# Patient Record
Sex: MALE | Race: WHITE | NOT HISPANIC OR LATINO | Employment: FULL TIME | URBAN - METROPOLITAN AREA
[De-identification: names, ages, dates, MRNs, and addresses within clinical notes are randomized per-mention and may not be internally consistent; named-entity substitution may affect disease eponyms.]

---

## 2022-09-07 ENCOUNTER — TRANSCRIBE ORDERS (OUTPATIENT)
Dept: PHYSICAL THERAPY | Facility: CLINIC | Age: 22
End: 2022-09-07

## 2022-09-07 DIAGNOSIS — S93.402S SPRAIN OF LEFT ANKLE, UNSPECIFIED LIGAMENT, SEQUELA: Primary | ICD-10-CM

## 2022-09-20 ENCOUNTER — TELEPHONE (OUTPATIENT)
Dept: PHYSICAL THERAPY | Facility: CLINIC | Age: 22
End: 2022-09-20

## 2022-09-21 ENCOUNTER — TELEPHONE (OUTPATIENT)
Dept: PHYSICAL THERAPY | Facility: CLINIC | Age: 22
End: 2022-09-21

## 2022-09-21 NOTE — TELEPHONE ENCOUNTER
PATIENT AND WORK COMP CALLED TO RESCHEDULE PATIENT-I DID NOT SEE ANYTHING UNTIL 10/11 PLEASE REACH OUT TO THE PATIENT TO RESCHEDULE AT YOUR EARLIEST CONVENIENCE ! THANK YOU!!

## 2022-10-06 ENCOUNTER — TREATMENT (OUTPATIENT)
Dept: PHYSICAL THERAPY | Facility: CLINIC | Age: 22
End: 2022-10-06

## 2022-10-06 DIAGNOSIS — S93.402D SPRAIN OF LEFT ANKLE, UNSPECIFIED LIGAMENT, SUBSEQUENT ENCOUNTER: Primary | ICD-10-CM

## 2022-10-06 PROCEDURE — 97161 PT EVAL LOW COMPLEX 20 MIN: CPT | Performed by: PHYSICAL THERAPIST

## 2022-10-06 PROCEDURE — 97110 THERAPEUTIC EXERCISES: CPT | Performed by: PHYSICAL THERAPIST

## 2022-10-06 NOTE — PROGRESS NOTES
Physical Therapy Initial Evaluation and Plan of Care  [address] [facility] [address], [address]  [place] [address]    Patient: Bulmaro Cameron   : 2000  Diagnosis/ICD-10 Code:  Sprain of left ankle, unspecified ligament, subsequent encounter [S93.402D]  Referring practitioner: RONNIE Cintron  Date of Initial Visit: 10/6/2022  Today's Date: 10/6/2022  Patient seen for 1 session         Visit Diagnoses:    ICD-10-CM ICD-9-CM   1. Sprain of left ankle, unspecified ligament, subsequent encounter  S93.402D V58.89     845.00         Subjective Questionnaire: FAAM 59      Subjective Evaluation    History of Present Illness  Date of onset: 2022  Mechanism of injury: Patient injured his left ankle at work.  Reports that he was in a trailer, rollers were pulled out and boxes stacked.  He was standing on the RTL and stepped off onto a box rolling his ankle.  Patient rolled his ankle inward.  Reports being seen by [facility] after.  Was put in a boot for about 2.5 weeks.      No history of ankle injuries.      Patient Occupation: [employer]   Precautions and Work Restrictions: normal job dutiesPain  Current pain ratin  At worst pain ratin  Location: throughout the left ankle and up the back of the leg  Quality: throbbing and sharp  Relieving factors: rest  Exacerbated by: long periods of standing.  Progression: improved             Objective          Observations     Additional Ankle/Foot Observation Details  Patient ambulates with a slow, guarded gait pattern.    Palpation     Additional Palpation Details  TTP to the left ATFL and minimally to the deltoid ligament.    Active Range of Motion   Left Ankle/Foot   Dorsiflexion (ke): WFL  Plantar flexion: WFL  Inversion: WFL  Eversion: WFL    Strength/Myotome Testing     Left Ankle/Foot   Dorsiflexion: 5  Plantar flexion: 5  Inversion: 4+  Eversion: 4+    Tests   Left Ankle/Foot   Negative for anterior drawer.     Additional Tests Details  - Inversion and -  Eversion Stress Tests          Assessment & Plan     Assessment  Impairments: lacks appropriate home exercise program and pain with function    Assessment details: Patient presents with c/o pain and TTP, but no other significant limitations.  Patient reports doing full job duties and not being limited with any ADL'S.    Barriers to therapy: none  Prognosis: good  Prognosis details: STG's to be met by 2 weeks  1)  Independent with HEP  2)  Decrease pain by 50% or more  3)  Min to no TTP present    LTG's to be met by 4 weeks  1)  Independent with HEP progression  2)  Decrease pain by 75% or more  3)  No TTP present  4)  Patient to ambulate with a normal gait speed      Plan  Planned therapy interventions: strengthening, stretching, therapeutic activities, home exercise program and gait training  Treatment plan discussed with: patient            Timed:         Manual Therapy:    0     mins  09412;     Therapeutic Exercise:    14     mins  40640;    Neuromuscular Royce:    0    mins  74040;    Therapeutic Activity:     0     mins  56908;     Gait Trainin     mins  31058;     Ultrasound:     0     mins  38579;          Un-Timed:  Electrical Stimulation:    0     mins  58077 ( );    Low Eval     10     Mins  61564  Mod Eval     0     Mins  57508  High Eval                       0     Mins  65092        Timed Treatment:   14   mins   Total Treatment:     24   mins          PT: Amish Muller PT     [place] License [id number]  Electronically signed by Amish Muller PT, 10/06/22, 1:28 PM EDT    Certification Period: 10/6/2022 thru 1/3/2023  I certify that the therapy services are furnished while this patient is under my care.  The services outlined above are required by this patient, and will be reviewed every 90 days.    Jaguar Montes, Aprn  [address] [place] [address]   NPI: [id number]      Amish Muller PT   License number: [id number]        Physician  Signature:__________________________________________________    PHYSICIAN: Jaguar Montes APRN      DATE:     Please sign and return via fax to [email or web address] . Thank you, [facility].

## 2022-11-16 ENCOUNTER — DOCUMENTATION (OUTPATIENT)
Dept: PHYSICAL THERAPY | Facility: CLINIC | Age: 22
End: 2022-11-16